# Patient Record
Sex: FEMALE | Race: WHITE | NOT HISPANIC OR LATINO | Employment: OTHER | URBAN - METROPOLITAN AREA
[De-identification: names, ages, dates, MRNs, and addresses within clinical notes are randomized per-mention and may not be internally consistent; named-entity substitution may affect disease eponyms.]

---

## 2022-03-10 ENCOUNTER — MEDICAL CORRESPONDENCE (OUTPATIENT)
Dept: HEALTH INFORMATION MANAGEMENT | Facility: CLINIC | Age: 62
End: 2022-03-10
Payer: COMMERCIAL

## 2022-04-11 ENCOUNTER — OFFICE VISIT (OUTPATIENT)
Dept: NEUROLOGY | Facility: CLINIC | Age: 62
End: 2022-04-11
Payer: COMMERCIAL

## 2022-04-11 ENCOUNTER — PRE VISIT (OUTPATIENT)
Dept: NEUROLOGY | Facility: CLINIC | Age: 62
End: 2022-04-11

## 2022-04-11 VITALS
OXYGEN SATURATION: 97 % | WEIGHT: 147 LBS | SYSTOLIC BLOOD PRESSURE: 148 MMHG | HEART RATE: 72 BPM | RESPIRATION RATE: 16 BRPM | DIASTOLIC BLOOD PRESSURE: 84 MMHG

## 2022-04-11 DIAGNOSIS — M62.50 DISUSE ATROPHY OF MUSCLE: Primary | ICD-10-CM

## 2022-04-11 DIAGNOSIS — M62.81 SUBJECTIVE MUSCLE WEAKNESS: ICD-10-CM

## 2022-04-11 PROCEDURE — 99204 OFFICE O/P NEW MOD 45 MIN: CPT | Performed by: PSYCHIATRY & NEUROLOGY

## 2022-04-11 RX ORDER — MAGNESIUM CARBONATE
325 POWDER (GRAM) MISCELLANEOUS
COMMUNITY

## 2022-04-11 RX ORDER — CARVEDILOL 12.5 MG/1
TABLET ORAL
COMMUNITY
Start: 2022-02-01

## 2022-04-11 RX ORDER — OMEPRAZOLE 40 MG/1
CAPSULE, DELAYED RELEASE ORAL
COMMUNITY
Start: 2021-09-29

## 2022-04-11 RX ORDER — LOPERAMIDE HYDROCHLORIDE 2 MG/1
2 TABLET ORAL
COMMUNITY

## 2022-04-11 ASSESSMENT — PAIN SCALES - GENERAL: PAINLEVEL: MODERATE PAIN (5)

## 2022-04-11 NOTE — LETTER
2022       RE: Tish Larsen  [address] [facility] [address]     Dear Colleague,    Thank you for referring your patient, Tish Larsen, to the [facility] NEUROLOGY CLINIC [place] at [facility]. Please see a copy of my visit note below.    Service Date: 2022    Vaughn Conde MD  [address]  [address]    RE:  Tish Larsen  MRN:  [id number]  :  1960    Dear Dr. Conde:    I had the pleasure to see Ms. Larsen at the [facility]. This is his third neurology opinion for her condition.  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  She has not had any illegal drugs or alcohol in many years.  She smokes 1 pack per day for the past 10 years.  She was in her normal state of health until about 10/2020.  At that time, she experienced an increase in her blood pressure requiring her primary care doctor to increase her antihypertensive medications including prescription of HCTZ that was new.  Then around October or 2020, she felt a sharp pain and swelling at her left knee and the upper calf that after extensive imaging studies was attributed to a ruptured Baker's cyst.  The pain was so severe that she was dragging her leg.  Around , she was taking care of her grandkids and one day on 2020 she syncopized when she got up from the chair.  Then she had a single episode of vomiting.  She also has had chronic diarrhea for a while that has been extensively investigated without any definite diagnosis.  Then, around 2021, she had gotten so weak that she was not able to get up from a chair on her own and her family transferred to a local ED.  She was hospitalized at [facility] in [place].  She was found to have profound hypomagnesemia with magnesium  levels of 0.7, hypokalemia with potassium level of 3.0, hypocalcemia with calcium of 7.9.  Electrolytes were replaced aggressively.  During that hospitalization she also was found to have a sed rate of 75 and a CRP initially of 7.42, with upper limit of normal of 0.35.  CK was normal at 65.  Lactic acid, TSH, B12 (419), CBC and urinalysis were normal.  She also had a CSF examination that showed normal protein, glucose, cell count. A respiratory virus pattern was negative with PCRs.  CK was persistently normal in the next month, and ESR and CRP gradually improved. Initially it was felt that she had polymyalgia rheumatica due to the elevated sed rate and CRP.  She improved a little bit after her hospitalization in January 2021 with electrolyte replacement and starting prednisone, but while still on the prednisone a few weeks later and by late 01/2021, her weakness got worse again.  She was experiencing myalgias in both thighs and from the shoulders to the elbows, and she had increasing difficulties getting up from a chair and raising her arms overhead.  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  This led to worsening myalgias and weakness transiently, and then her condition stabilized without any further progression of her weakness in the last year (yet far from normal). She had difficulty tolerating the prednisone due to stomach pain and nausea.   Around 4/2021, she began experiencing significant dyspnea on exertion even by walking a block or ascending 1 flight of stairs.  She has, however, absolutely no orthopnea, does not sleep with the head of the bed elevated and does not get morning headaches or nonrefreshing sleep.  She denies cough, wheezing or phlegm production.      She then saw Neurologist Dr. Christianson on 04/12/2021 at [place].  He did an EMG of upper and lower extremities that  was normal.  She then had a second opinion at [facility] in 8/2021, where she saw Internal Medicine, Rheumatology and Neurology.  Neurology repeated her EMG, that was again normal, and upper and lower extremity repetitive nerve stimulation showed no evidence of neuromuscular junction disorder.  Necrotizing myopathy antibody panel as well as a CK, aldolase, TSH were normal.  CCP level was found to be markedly elevated at 234.5.  Rheumatoid factor was minimally elevated.  MuSK and acetylcholine receptor antibodies were fine.  Autoimmune myelopathy panel with paraneoplastic antibodies was negative.  She then ultimately had a muscle biopsy of the left upper quadriceps in 02/2022 (off steroids for many months) that showed denervation atrophy and type 2 atrophy but no primary myopathy.  She does not have any skin rashes, arthralgias or Raynaud syndrome.  She denies dysphagia, dysarthria, sialorrhea, head drop, ptosis, diplopia or other bulbar symptoms.  She does not have any difficulty chewing, but she gets shortness of breath when she chews for too long.  She has noticed some muscle twitching only in the last couple of weeks but not prior to that.      There is no family history of muscle disease.    SOCIAL HISTORY:  As above.    PAST MEDICAL HISTORY:  As above.    CURRENT MEDICATIONS:  Include carvedilol 12.5 mg daily, omeprazole and loperamide p.r.n. for diarrhea.  She used to be on atorvastatin and did stop it, but she did not appreciate any improvement of her weakness for several months after discontinuing the statin.    BP (!) 148/84   Pulse 72   Resp 16   Wt 66.7 kg (147 lb)   SpO2 97%     PHYSICAL EXAMINATION:  On exam, she is awake, alert and oriented x3.  She is able to provide a coherent history.  Cranial nerves II-XII are normal.  Neck flexion and extension strength are full.  Strength is 5/5 for biceps, triceps, wrist extensors, finger extensors, FDI, APB and hand .  With shoulder abduction, the  findings are interesting.  When passively abducted at 90 degrees, she gives full power against resistance, so it is 5/5.  She claims she has marked difficulty abducting the shoulder between 30 and 60 degrees, yet she can provide full power against resistance, which is peculiar.  Her hip flexion, adduction, and abduction are all 4+/5 with inconsistent and variable effort. Knee extension, knee flexion, foot dorsiflexion, great toe extension strength are all normal.  Tone is normal.  There is no overt muscle atrophy or fasciculations.  Reflexes are 2+ and symmetric in the uppers and lowers.  Toes are downgoing.  Sensory exam is intact to light touch, proprioception and joint position in upper and lower extremities.  Finger-to-nose is done without dysmetria or tremor.  She can get up from a chair with arms crossed on the chest slowly but independently.  She has some difficulties doing tandem gait, but Romberg sign is clearly negative.    In summary, I think Ms. Larsen has diffuse muscle weakness due to deconditioning.  Initially, her weakness likely was precipitated by severe electrolyte abnormalities including hypomagnesemia and hypokalemia, which could have been caused by diuretics combined with vomiting and diarrhea.  Thereafter, however, I believe that steroids played a role in her type 2 muscle fiber atrophy and subjective weakness.  I do not think she has an immune myopathy or muscular dystrophy, and I do not think she needs any further neuromuscular workup.  I encouraged her to continue regular exercise, and I would refer her to Physical Therapy locally.      I don't think she had polymyalgia rheumatica. PMR manifests with morning stiffness and pain especially in the shoulder region that improves as the day goes by. She did not have any of those symptoms when the first elevated ESR was found in 1/2021. ESR is a very non-specific lab test. In addition, recent ESR and CRPs were normal off steroids.     You started  her on low-dose naltrexone about a month ago, and she has found it beneficial.  I do not have any specific indication for which low-dose naltrexone would be recommended, but since she has appreciated the benefit, I am okay with her continuing it.  She may also benefit building up some muscle by taking creatine supplements 8349-2264 mg daily.  This can be found in any vitamin shop over the counter.    Thank you for allowing me to participate in the care of Ms. Larsen.  I will see her in followup as necessary.  Total time spent on this encounter today 55 minutes, of which 30 face-to-face, 10 on post-visit note dictation, editing and orders, and 15 in pre-visit chart review.    Sincerely,    Robert Regalado MD        D: 2022   T: 2022   MT: walter    Name:     JOSIAS LARSEN  MRN:      [id number]        Account:      [id number]   :      1960           Service Date: 2022     Document: [id number]

## 2022-04-11 NOTE — PATIENT INSTRUCTIONS
I think that your mild proximal muscle weakness began around New Year of 2021 due to electrolyte abnormalities (very low magnesium, potassium and calcium, likely related to diuretic pills, vomiting and diarrhea) and worsened due to exposure to steroids.   I don't think you have any muscle disease that will keep on getting worse over time. I actually feel you could get better over time with good nutrition, physical therapy (please ask your primary to refer you) and perhaps taking creatine supplement, 2287-6505 mg daily (you can find it in vitamin shops).    Follow up as needed

## 2022-04-11 NOTE — NURSING NOTE
Chief Complaint   Patient presents with     Consult     NEW MUSCULAR DYSTROPHY     Unruly Carter

## 2022-04-11 NOTE — PROGRESS NOTES
Service Date: 2022    Vaughn Conde MD  [address]  [address]    RE:  Tish Larsen  MRN:  [id number]  :  1960    Dear Dr. Conde:    I had the pleasure to see Ms. Larsen at the [facility]. This is his third neurology opinion for her condition.  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  She has not had any illegal drugs or alcohol in many years.  She smokes 1 pack per day for the past 10 years.  She was in her normal state of health until about 10/2020.  At that time, she experienced an increase in her blood pressure requiring her primary care doctor to increase her antihypertensive medications including prescription of HCTZ that was new.  Then around October or 2020, she felt a sharp pain and swelling at her left knee and the upper calf that after extensive imaging studies was attributed to a ruptured Baker's cyst.  The pain was so severe that she was dragging her leg.  Around [place], she was taking care of her grandkids and one day on 2020 she syncopized when she got up from the chair.  Then she had a single episode of vomiting.  She also has had chronic diarrhea for a while that has been extensively investigated without any definite diagnosis.  Then, around 2021, she had gotten so weak that she was not able to get up from a chair on her own and her family transferred to a local ED.  She was hospitalized at [facility] in [place].  She was found to have profound hypomagnesemia with magnesium levels of 0.7, hypokalemia with potassium level of 3.0, hypocalcemia with calcium of 7.9.  Electrolytes were replaced aggressively.  During that hospitalization she also was found to have a sed rate of 75 and a CRP initially of 7.42, with upper limit of normal of 0.35.  CK was normal at 65.  Lactic acid, TSH, B12  (419), CBC and urinalysis were normal.  She also had a CSF examination that showed normal protein, glucose, cell count. A respiratory virus pattern was negative with PCRs.  CK was persistently normal in the next month, and ESR and CRP gradually improved. Initially it was felt that she had polymyalgia rheumatica due to the elevated sed rate and CRP.  She improved a little bit after her hospitalization in January 2021 with electrolyte replacement and starting prednisone, but while still on the prednisone a few weeks later and by late 01/2021, her weakness got worse again.  She was experiencing myalgias in both thighs and from the shoulders to the elbows, and she had increasing difficulties getting up from a chair and raising her arms overhead.  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  This led to worsening myalgias and weakness transiently, and then her condition stabilized without any further progression of her weakness in the last year (yet far from normal). She had difficulty tolerating the prednisone due to stomach pain and nausea.   Around 4/2021, she began experiencing significant dyspnea on exertion even by walking a block or ascending 1 flight of stairs.  She has, however, absolutely no orthopnea, does not sleep with the head of the bed elevated and does not get morning headaches or nonrefreshing sleep.  She denies cough, wheezing or phlegm production.      She then saw Neurologist Dr. Christianson on 04/12/2021 at [place].  He did an EMG of upper and lower extremities that was normal.  She then had a second opinion at [facility] in 8/2021, where she saw Internal Medicine, Rheumatology and Neurology.  Neurology repeated her EMG, that was again normal, and upper and lower extremity repetitive nerve stimulation showed no evidence of neuromuscular junction disorder.  Necrotizing myopathy  antibody panel as well as a CK, aldolase, TSH were normal.  CCP level was found to be markedly elevated at 234.5.  Rheumatoid factor was minimally elevated.  MuSK and acetylcholine receptor antibodies were fine.  Autoimmune myelopathy panel with paraneoplastic antibodies was negative.  She then ultimately had a muscle biopsy of the left upper quadriceps in 02/2022 (off steroids for many months) that showed denervation atrophy and type 2 atrophy but no primary myopathy.  She does not have any skin rashes, arthralgias or Raynaud syndrome.  She denies dysphagia, dysarthria, sialorrhea, head drop, ptosis, diplopia or other bulbar symptoms.  She does not have any difficulty chewing, but she gets shortness of breath when she chews for too long.  She has noticed some muscle twitching only in the last couple of weeks but not prior to that.      There is no family history of muscle disease.    SOCIAL HISTORY:  As above.    PAST MEDICAL HISTORY:  As above.    CURRENT MEDICATIONS:  Include carvedilol 12.5 mg daily, omeprazole and loperamide p.r.n. for diarrhea.  She used to be on atorvastatin and did stop it, but she did not appreciate any improvement of her weakness for several months after discontinuing the statin.    BP (!) 148/84   Pulse 72   Resp 16   Wt 66.7 kg (147 lb)   SpO2 97%     PHYSICAL EXAMINATION:  On exam, she is awake, alert and oriented x3.  She is able to provide a coherent history.  Cranial nerves II-XII are normal.  Neck flexion and extension strength are full.  Strength is 5/5 for biceps, triceps, wrist extensors, finger extensors, FDI, APB and hand .  With shoulder abduction, the findings are interesting.  When passively abducted at 90 degrees, she gives full power against resistance, so it is 5/5.  She claims she has marked difficulty abducting the shoulder between 30 and 60 degrees, yet she can provide full power against resistance, which is peculiar.  Her hip flexion, adduction, and abduction  are all 4+/5 with inconsistent and variable effort. Knee extension, knee flexion, foot dorsiflexion, great toe extension strength are all normal.  Tone is normal.  There is no overt muscle atrophy or fasciculations.  Reflexes are 2+ and symmetric in the uppers and lowers.  Toes are downgoing.  Sensory exam is intact to light touch, proprioception and joint position in upper and lower extremities.  Finger-to-nose is done without dysmetria or tremor.  She can get up from a chair with arms crossed on the chest slowly but independently.  She has some difficulties doing tandem gait, but Romberg sign is clearly negative.    In summary, I think Ms. Larsen has diffuse muscle weakness due to deconditioning.  Initially, her weakness likely was precipitated by severe electrolyte abnormalities including hypomagnesemia and hypokalemia, which could have been caused by diuretics combined with vomiting and diarrhea.  Thereafter, however, I believe that steroids played a role in her type 2 muscle fiber atrophy and subjective weakness.  I do not think she has an immune myopathy or muscular dystrophy, and I do not think she needs any further neuromuscular workup.  I encouraged her to continue regular exercise, and I would refer her to Physical Therapy locally.      I don't think she had polymyalgia rheumatica. PMR manifests with morning stiffness and pain especially in the shoulder region that improves as the day goes by. She did not have any of those symptoms when the first elevated ESR was found in 1/2021. ESR is a very non-specific lab test. In addition, recent ESR and CRPs were normal off steroids.     You started her on low-dose naltrexone about a month ago, and she has found it beneficial.  I do not have any specific indication for which low-dose naltrexone would be recommended, but since she has appreciated the benefit, I am okay with her continuing it.  She may also benefit building up some muscle by taking creatine supplements  8089-6335 mg daily.  This can be found in any vitamin shop over the counter.    Thank you for allowing me to participate in the care of Ms. Larsen.  I will see her in followup as necessary.  Total time spent on this encounter today 55 minutes, of which 30 face-to-face, 10 on post-visit note dictation, editing and orders, and 15 in pre-visit chart review.    Sincerely,    Robert Regalado MD        D: 2022   T: 2022   MT:     Name:     JOSIAS LARSEN  MRN:      [id number]        Account:      [id number]   :      1960           Service Date: 2022       Document: [id number]

## 2022-05-21 ENCOUNTER — HEALTH MAINTENANCE LETTER (OUTPATIENT)
Age: 62
End: 2022-05-21

## 2022-09-17 ENCOUNTER — HEALTH MAINTENANCE LETTER (OUTPATIENT)
Age: 62
End: 2022-09-17

## 2023-03-09 NOTE — TELEPHONE ENCOUNTER
RECORDS RECEIVED FROM: External   REASON FOR VISIT: Myopathy   Date of Appt: 4/11/22   NOTES (FOR ALL VISITS) STATUS DETAILS   OFFICE NOTE from referring provider Care Everywhere Dr Vaughn Conde @ [facility]:  3/10/22 telephone encounter  2/25/22 1/21/22 12/17/21   OFFICE NOTE from other specialist Care Everywhere Dr Michelle Galeano @ [facility]:  10/1/21  7/13/21    Dr Yamlie Plascencia @ [facility]:  8/13/21    Dr Lakia Christianson @ [facility]:  4/23/21    Dr Radha Hernandez @ [facility]:  3/23/21   DISCHARGE SUMMARY from hospital Care Everywhere [facility]:  12/31/20-1/3/21   DISCHARGE REPORT from the ER Care Everywhere [facility]:  10/23/20   OPERATIVE REPORT Care Everywhere [facility]:  2/16/22  Muscle Bx   MEDICATION LIST Care Everywhere    IMAGING  (FOR ALL VISITS)     EMG Care Everywhere [place]:  8/12/21    [facility]:  4/23/21   LUMBAR PUNCTURE Care Everywhere [facility]:  1/1/21   MRI (HEAD, NECK, SPINE) Received [facility]:  MRI Thoracic Spine 1/7/21  MRI Lumbar Spine 1/7/21  MRI Cervical Spine 1/5/21  MRI Brain 1/5/21  MRI Lumbar Spine 12/31/20  MRI Knee Left 12/28/20     [facility]:  MRI Tib Fib Left 11/11/20      Action 3/14/22 MV 10.21am    Action Taken 1) imaging request faxed to [facility]  2) imaging request faxed to [facility]    --3/16/22 MV 1.27pm--  1) [facility] images received via mail and sent to  for processing. No images indicated on disk.  2) imaging received from [facility] and resolved in PACS         
none

## 2023-06-04 ENCOUNTER — HEALTH MAINTENANCE LETTER (OUTPATIENT)
Age: 63
End: 2023-06-04

## 2024-05-04 ENCOUNTER — HEALTH MAINTENANCE LETTER (OUTPATIENT)
Age: 64
End: 2024-05-04

## 2024-07-13 ENCOUNTER — HEALTH MAINTENANCE LETTER (OUTPATIENT)
Age: 64
End: 2024-07-13